# Patient Record
Sex: FEMALE | Race: WHITE | NOT HISPANIC OR LATINO | Employment: FULL TIME | URBAN - METROPOLITAN AREA
[De-identification: names, ages, dates, MRNs, and addresses within clinical notes are randomized per-mention and may not be internally consistent; named-entity substitution may affect disease eponyms.]

---

## 2022-11-07 ENCOUNTER — OFFICE VISIT (OUTPATIENT)
Dept: ORTHOPEDIC SURGERY | Facility: CLINIC | Age: 57
End: 2022-11-07

## 2022-11-07 VITALS — WEIGHT: 169 LBS | HEART RATE: 62 BPM | HEIGHT: 61 IN | BODY MASS INDEX: 31.91 KG/M2

## 2022-11-07 DIAGNOSIS — S52.121A CLOSED DISPLACED FRACTURE OF HEAD OF RIGHT RADIUS, INITIAL ENCOUNTER: Primary | ICD-10-CM

## 2022-11-07 PROCEDURE — 99203 OFFICE O/P NEW LOW 30 MIN: CPT | Performed by: ORTHOPAEDIC SURGERY

## 2022-11-07 NOTE — PROGRESS NOTES
"     Patient ID: Kerry Escobedo is a 57 y.o. female.    Chief Complaint:    Chief Complaint   Patient presents with   • Right Elbow - Initial Evaluation, Pain     Pain 2 DOI 11/2/2022       HPI:  This is a 57-year-old female here from Dr. Ornelas after a fall in November 2 resulting in acute pain to the right elbow.  She is in a splint no other complaints  Past Medical History:   Diagnosis Date   • Lyme disease        Past Surgical History:   Procedure Laterality Date   • TUBAL ABDOMINAL LIGATION         History reviewed. No pertinent family history.       Social History     Occupational History   • Not on file   Tobacco Use   • Smoking status: Former     Types: Cigarettes   • Smokeless tobacco: Never   Vaping Use   • Vaping Use: Never used   Substance and Sexual Activity   • Alcohol use: Never   • Drug use: Never   • Sexual activity: Defer      Review of Systems   Cardiovascular: Negative for chest pain.   Musculoskeletal: Positive for arthralgias.       Objective:    Pulse 62   Ht 154.9 cm (61\")   Wt 76.7 kg (169 lb)   BMI 31.93 kg/m²     Physical Examination:  Right elbow demonstrates mild to moderate swelling and bruising with tenderness over the lateral condyle no medial pain no pain of the shoulder or wrist with forage motion of the digits  Sensory and motor exam are intact all distributions. Radial pulse is palpable and capillary refill is less than two seconds to all digits.    Imaging:  Previous x-rays demonstrate displaced comminuted radial head fracture    Assessment:  Displaced intra-articular radial head fracture    Plan:  I believe there is a consideration for open reduction internal fixation versus radial head replacement I have referred her to a hand center who can accommodate the surgery continue ice and sling      Procedures         Disclaimer: Part of this note may be an electronic transcription/translation of spoken language to printed text using the Dragon Dictation System  "